# Patient Record
Sex: FEMALE | Race: OTHER | HISPANIC OR LATINO
[De-identification: names, ages, dates, MRNs, and addresses within clinical notes are randomized per-mention and may not be internally consistent; named-entity substitution may affect disease eponyms.]

---

## 2018-03-17 ENCOUNTER — APPOINTMENT (OUTPATIENT)
Dept: MAMMOGRAPHY | Facility: CLINIC | Age: 43
End: 2018-03-17
Payer: COMMERCIAL

## 2018-03-17 ENCOUNTER — OUTPATIENT (OUTPATIENT)
Dept: OUTPATIENT SERVICES | Facility: HOSPITAL | Age: 43
LOS: 1 days | End: 2018-03-17
Payer: COMMERCIAL

## 2018-03-17 DIAGNOSIS — Z00.8 ENCOUNTER FOR OTHER GENERAL EXAMINATION: ICD-10-CM

## 2018-03-17 PROCEDURE — 77067 SCR MAMMO BI INCL CAD: CPT

## 2018-03-17 PROCEDURE — 77063 BREAST TOMOSYNTHESIS BI: CPT

## 2018-03-17 PROCEDURE — 77067 SCR MAMMO BI INCL CAD: CPT | Mod: 26

## 2018-03-17 PROCEDURE — 77063 BREAST TOMOSYNTHESIS BI: CPT | Mod: 26

## 2019-08-20 PROBLEM — E78.5 HYPERLIPIDEMIA, UNSPECIFIED: Status: ACTIVE | Noted: 2019-08-20

## 2019-08-20 PROBLEM — Z87.39 HISTORY OF INFLAMMATION OF SACROILIAC JOINT: Status: RESOLVED | Noted: 2019-08-20 | Resolved: 2019-08-20

## 2019-08-20 PROBLEM — Z87.42 HISTORY OF AMENORRHEA: Status: RESOLVED | Noted: 2019-08-20 | Resolved: 2019-08-20

## 2019-08-20 PROBLEM — R94.31 ABNORMAL ECG: Status: ACTIVE | Noted: 2019-08-20

## 2019-08-21 ENCOUNTER — APPOINTMENT (OUTPATIENT)
Dept: CARDIOLOGY | Facility: CLINIC | Age: 44
End: 2019-08-21

## 2019-08-21 ENCOUNTER — OUTPATIENT (OUTPATIENT)
Dept: OUTPATIENT SERVICES | Facility: HOSPITAL | Age: 44
LOS: 1 days | End: 2019-08-21
Payer: SELF-PAY

## 2019-08-21 VITALS
BODY MASS INDEX: 30.44 KG/M2 | RESPIRATION RATE: 16 BRPM | DIASTOLIC BLOOD PRESSURE: 83 MMHG | WEIGHT: 145 LBS | SYSTOLIC BLOOD PRESSURE: 142 MMHG | HEART RATE: 53 BPM | HEIGHT: 58 IN

## 2019-08-21 DIAGNOSIS — Z87.39 PERSONAL HISTORY OF OTHER DISEASES OF THE MUSCULOSKELETAL SYSTEM AND CONNECTIVE TISSUE: ICD-10-CM

## 2019-08-21 DIAGNOSIS — Z87.42 PERSONAL HISTORY OF OTHER DISEASES OF THE FEMALE GENITAL TRACT: ICD-10-CM

## 2019-08-21 DIAGNOSIS — R94.31 ABNORMAL ELECTROCARDIOGRAM [ECG] [EKG]: ICD-10-CM

## 2019-08-21 DIAGNOSIS — R07.89 OTHER CHEST PAIN: ICD-10-CM

## 2019-08-21 DIAGNOSIS — I25.10 ATHEROSCLEROTIC HEART DISEASE OF NATIVE CORONARY ARTERY WITHOUT ANGINA PECTORIS: ICD-10-CM

## 2019-08-21 DIAGNOSIS — E78.5 HYPERLIPIDEMIA, UNSPECIFIED: ICD-10-CM

## 2019-08-29 ENCOUNTER — FORM ENCOUNTER (OUTPATIENT)
Age: 44
End: 2019-08-29

## 2019-08-30 ENCOUNTER — OUTPATIENT (OUTPATIENT)
Dept: OUTPATIENT SERVICES | Facility: HOSPITAL | Age: 44
LOS: 1 days | End: 2019-08-30
Payer: SELF-PAY

## 2019-08-30 DIAGNOSIS — I25.10 ATHEROSCLEROTIC HEART DISEASE OF NATIVE CORONARY ARTERY WITHOUT ANGINA PECTORIS: ICD-10-CM

## 2019-08-30 PROCEDURE — 93306 TTE W/DOPPLER COMPLETE: CPT | Mod: 26

## 2019-08-30 PROCEDURE — 93017 CV STRESS TEST TRACING ONLY: CPT

## 2019-08-30 PROCEDURE — 93306 TTE W/DOPPLER COMPLETE: CPT

## 2019-10-23 ENCOUNTER — APPOINTMENT (OUTPATIENT)
Dept: CARDIOLOGY | Facility: CLINIC | Age: 44
End: 2019-10-23

## 2020-02-27 ENCOUNTER — EMERGENCY (EMERGENCY)
Facility: HOSPITAL | Age: 45
LOS: 1 days | Discharge: HOME | End: 2020-02-27
Attending: EMERGENCY MEDICINE
Payer: MEDICAID

## 2020-02-27 VITALS
DIASTOLIC BLOOD PRESSURE: 66 MMHG | RESPIRATION RATE: 19 BRPM | OXYGEN SATURATION: 99 % | SYSTOLIC BLOOD PRESSURE: 133 MMHG | TEMPERATURE: 99 F | HEART RATE: 58 BPM

## 2020-02-27 VITALS
SYSTOLIC BLOOD PRESSURE: 160 MMHG | OXYGEN SATURATION: 99 % | TEMPERATURE: 98 F | HEIGHT: 60 IN | RESPIRATION RATE: 18 BRPM | HEART RATE: 68 BPM | DIASTOLIC BLOOD PRESSURE: 87 MMHG | WEIGHT: 147.93 LBS

## 2020-02-27 DIAGNOSIS — Z90.49 ACQUIRED ABSENCE OF OTHER SPECIFIED PARTS OF DIGESTIVE TRACT: Chronic | ICD-10-CM

## 2020-02-27 LAB
ALBUMIN SERPL ELPH-MCNC: 3.9 G/DL — SIGNIFICANT CHANGE UP (ref 3.3–5.2)
ALP SERPL-CCNC: 162 U/L — HIGH (ref 40–120)
ALT FLD-CCNC: 123 U/L — HIGH
ANION GAP SERPL CALC-SCNC: 14 MMOL/L — SIGNIFICANT CHANGE UP (ref 5–17)
AST SERPL-CCNC: 55 U/L — HIGH
BASOPHILS # BLD AUTO: 0.03 K/UL — SIGNIFICANT CHANGE UP (ref 0–0.2)
BASOPHILS NFR BLD AUTO: 0.5 % — SIGNIFICANT CHANGE UP (ref 0–2)
BILIRUB SERPL-MCNC: 0.2 MG/DL — LOW (ref 0.4–2)
BUN SERPL-MCNC: 12 MG/DL — SIGNIFICANT CHANGE UP (ref 8–20)
CALCIUM SERPL-MCNC: 8.9 MG/DL — SIGNIFICANT CHANGE UP (ref 8.6–10.2)
CHLORIDE SERPL-SCNC: 103 MMOL/L — SIGNIFICANT CHANGE UP (ref 98–107)
CO2 SERPL-SCNC: 21 MMOL/L — LOW (ref 22–29)
CREAT SERPL-MCNC: 0.62 MG/DL — SIGNIFICANT CHANGE UP (ref 0.5–1.3)
EOSINOPHIL # BLD AUTO: 0.1 K/UL — SIGNIFICANT CHANGE UP (ref 0–0.5)
EOSINOPHIL NFR BLD AUTO: 1.5 % — SIGNIFICANT CHANGE UP (ref 0–6)
GLUCOSE SERPL-MCNC: 143 MG/DL — HIGH (ref 70–99)
HCG SERPL-ACNC: <4 MIU/ML — SIGNIFICANT CHANGE UP
HCT VFR BLD CALC: 37.4 % — SIGNIFICANT CHANGE UP (ref 34.5–45)
HGB BLD-MCNC: 12.6 G/DL — SIGNIFICANT CHANGE UP (ref 11.5–15.5)
IMM GRANULOCYTES NFR BLD AUTO: 0.5 % — SIGNIFICANT CHANGE UP (ref 0–1.5)
LYMPHOCYTES # BLD AUTO: 2.25 K/UL — SIGNIFICANT CHANGE UP (ref 1–3.3)
LYMPHOCYTES # BLD AUTO: 33.8 % — SIGNIFICANT CHANGE UP (ref 13–44)
MCHC RBC-ENTMCNC: 29.1 PG — SIGNIFICANT CHANGE UP (ref 27–34)
MCHC RBC-ENTMCNC: 33.7 GM/DL — SIGNIFICANT CHANGE UP (ref 32–36)
MCV RBC AUTO: 86.4 FL — SIGNIFICANT CHANGE UP (ref 80–100)
MONOCYTES # BLD AUTO: 0.55 K/UL — SIGNIFICANT CHANGE UP (ref 0–0.9)
MONOCYTES NFR BLD AUTO: 8.3 % — SIGNIFICANT CHANGE UP (ref 2–14)
NEUTROPHILS # BLD AUTO: 3.69 K/UL — SIGNIFICANT CHANGE UP (ref 1.8–7.4)
NEUTROPHILS NFR BLD AUTO: 55.4 % — SIGNIFICANT CHANGE UP (ref 43–77)
PLATELET # BLD AUTO: 179 K/UL — SIGNIFICANT CHANGE UP (ref 150–400)
POTASSIUM SERPL-MCNC: 4 MMOL/L — SIGNIFICANT CHANGE UP (ref 3.5–5.3)
POTASSIUM SERPL-SCNC: 4 MMOL/L — SIGNIFICANT CHANGE UP (ref 3.5–5.3)
PROT SERPL-MCNC: 6.9 G/DL — SIGNIFICANT CHANGE UP (ref 6.6–8.7)
RBC # BLD: 4.33 M/UL — SIGNIFICANT CHANGE UP (ref 3.8–5.2)
RBC # FLD: 12.6 % — SIGNIFICANT CHANGE UP (ref 10.3–14.5)
SODIUM SERPL-SCNC: 138 MMOL/L — SIGNIFICANT CHANGE UP (ref 135–145)
TROPONIN T SERPL-MCNC: <0.01 NG/ML — SIGNIFICANT CHANGE UP (ref 0–0.06)
WBC # BLD: 6.65 K/UL — SIGNIFICANT CHANGE UP (ref 3.8–10.5)
WBC # FLD AUTO: 6.65 K/UL — SIGNIFICANT CHANGE UP (ref 3.8–10.5)

## 2020-02-27 PROCEDURE — 84702 CHORIONIC GONADOTROPIN TEST: CPT

## 2020-02-27 PROCEDURE — 71046 X-RAY EXAM CHEST 2 VIEWS: CPT | Mod: 26

## 2020-02-27 PROCEDURE — T1013: CPT

## 2020-02-27 PROCEDURE — 73030 X-RAY EXAM OF SHOULDER: CPT | Mod: 26,LT

## 2020-02-27 PROCEDURE — 96374 THER/PROPH/DIAG INJ IV PUSH: CPT

## 2020-02-27 PROCEDURE — 85027 COMPLETE CBC AUTOMATED: CPT

## 2020-02-27 PROCEDURE — 99284 EMERGENCY DEPT VISIT MOD MDM: CPT | Mod: 25

## 2020-02-27 PROCEDURE — 93005 ELECTROCARDIOGRAM TRACING: CPT

## 2020-02-27 PROCEDURE — 80053 COMPREHEN METABOLIC PANEL: CPT

## 2020-02-27 PROCEDURE — 99285 EMERGENCY DEPT VISIT HI MDM: CPT

## 2020-02-27 PROCEDURE — 93010 ELECTROCARDIOGRAM REPORT: CPT

## 2020-02-27 PROCEDURE — 73030 X-RAY EXAM OF SHOULDER: CPT

## 2020-02-27 PROCEDURE — 71046 X-RAY EXAM CHEST 2 VIEWS: CPT

## 2020-02-27 PROCEDURE — 84484 ASSAY OF TROPONIN QUANT: CPT

## 2020-02-27 PROCEDURE — 36415 COLL VENOUS BLD VENIPUNCTURE: CPT

## 2020-02-27 RX ORDER — LIDOCAINE 4 G/100G
1 CREAM TOPICAL
Qty: 4 | Refills: 0
Start: 2020-02-27 | End: 2020-03-01

## 2020-02-27 RX ORDER — LIDOCAINE 4 G/100G
1 CREAM TOPICAL ONCE
Refills: 0 | Status: COMPLETED | OUTPATIENT
Start: 2020-02-27 | End: 2020-02-27

## 2020-02-27 RX ORDER — ASPIRIN/CALCIUM CARB/MAGNESIUM 324 MG
162 TABLET ORAL ONCE
Refills: 0 | Status: COMPLETED | OUTPATIENT
Start: 2020-02-27 | End: 2020-02-27

## 2020-02-27 RX ORDER — KETOROLAC TROMETHAMINE 30 MG/ML
15 SYRINGE (ML) INJECTION ONCE
Refills: 0 | Status: DISCONTINUED | OUTPATIENT
Start: 2020-02-27 | End: 2020-02-27

## 2020-02-27 RX ORDER — METHOCARBAMOL 500 MG/1
1000 TABLET, FILM COATED ORAL ONCE
Refills: 0 | Status: COMPLETED | OUTPATIENT
Start: 2020-02-27 | End: 2020-02-27

## 2020-02-27 RX ORDER — METHOCARBAMOL 500 MG/1
3 TABLET, FILM COATED ORAL
Qty: 30 | Refills: 0
Start: 2020-02-27 | End: 2020-03-02

## 2020-02-27 RX ADMIN — Medication 15 MILLIGRAM(S): at 21:16

## 2020-02-27 RX ADMIN — Medication 162 MILLIGRAM(S): at 19:01

## 2020-02-27 RX ADMIN — METHOCARBAMOL 1000 MILLIGRAM(S): 500 TABLET, FILM COATED ORAL at 21:16

## 2020-02-27 RX ADMIN — LIDOCAINE 1 PATCH: 4 CREAM TOPICAL at 21:16

## 2020-02-27 NOTE — ED ADULT NURSE NOTE - OBJECTIVE STATEMENT
pt care assumed at 1850, no apparent distress noted at this time. pt received Alert and Oriented to person, place, situation and time resting in bed comfortably with spouse at bedside. pt c/o left arm with radiation to the shoulder and chest that started Monday night. pt denies chest pain at this time. pt states pain is most intense in her shoulder and with movement. 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 HR is NSR on cardiac monitor, lung sounds are clear b/l, abd is soft and nontender with positive bowel sounds in all four quadrants, skin is warm, dry and appropriate for age and race.

## 2020-02-27 NOTE — ED PROVIDER NOTE - PATIENT PORTAL LINK FT
You can access the FollowMyHealth Patient Portal offered by [facility] by registering at the following website: [email or web address]. By joining [employer]’s FollowMyHealth portal, you will also be able to view your health information using other applications (apps) compatible with our system.

## 2020-02-27 NOTE — ED PROVIDER NOTE - PHYSICAL EXAMINATION
General: well appearing, NAD  Head:  NC, AT  Eyes: EOMI, PERRLA, no scleral icterus  Ears: no erythema/drainage  Nose: midline, no bleeding/drainage  Throat: uvula midline, no lesions, MMM  Cardiac: RRR, no m/r/g, no lower extremity edema  Respiratory: CTABL, no wheezes/rales, equal chest wall expansions  Abdomen: soft, ND, NT, no rebound tenderness, no guarding, nonperitonitic  MSK/Vascular: full ROM, distal pulses intact, soft compartments, warm extremities, tenderness on palpation of the left shoulder  Neuro: AAOx3, motor/sensory intact, LUE range limited secondary to pain at the level of the shoulder  Psych: calm, cooperative, normal affect

## 2020-02-27 NOTE — ED PROVIDER NOTE - CARE PROVIDER_API CALL
Dayan Irwin)  Orthopedics  [address] [place] [address], [address]  [place] [address]  Phone: [phone]  Fax: [phone]  Follow Up Time:

## 2020-02-27 NOTE — ED ADULT NURSE NOTE - CHIEF COMPLAINT QUOTE
Patient arrived to ED today with c/o chest pain, left arm pain, left shoulder pain, left sided neck pain for the past 3 days.  Patient denies injury.  Patient has trouble moving her arm due to the pain.

## 2020-02-27 NOTE — ED PROVIDER NOTE - NSFOLLOWUPINSTRUCTIONS_ED_ALL_ED_FT
Continúe estirando y moviendo el brazo para evitar el hombro congelado, que puede ser raleigh enfermedad y complicación debilitante de por jeremy. Use ibuprofeno y tylenol. Hielo y compresión para mayor comodidad. Gisella un seguimiento con bishop proveedor de atención primaria y cirugía ortopédica dentro de raleigh semana.    Continue to stretch and move your arm to avoid frozen shoulder which can be a life long debilitating disease and complication. Use ibuprofen and tylenol. Ice and compression for comfort. Follow up with your primary care provider and orthopaedic surgery within one week.    -----------------------------------------------------------------------------------------------------------    Tendinitis  (Tendinitis)    La tendinitis es la hinchazón (inflamación) de los tendones. Un tendón es un tejido blando que conecta el músculo al hueso. La tendinitis puede causar dolor, hinchazón o sensibilidad con la palpación. Generalmente, se trata con tratamiento de RHCE. RHCE significa:    Reposo.  Hielo.  Compresión. [place]au significa aplicar presión en la pramod afectada.  Elevación. [place]au significa mantener la pramod afectada elevada por encima del nivel del corazón.     CUIDADOS EN EL HOGAR  Si tiene raleigh férula o un dispositivo ortopédico:    Use la férula o el dispositivo ortopédico según las indicaciones de bishop médico. Quíteselos solamente bob se lo haya indicado el médico.  Afloje la férula o el dispositivo ortopédico si los dedos de las maría o de los pies se le entumecen, siente hormigueos o se le enfrían y se tornan de color guy.  No se dé lincoln de inmersión, no practique natación ni use el jacuzzi hasta que el médico lo apruebe. Pregúntele al médico si puede ducharse. Parish vez solo le permitan justin lincoln de esponja.  No permita que bishop férula o dispositivo ortopédico se mojen si no son impermeables.  Si la férula o el dispositivo ortopédico no son impermeables, cúbralos con raleigh bolsa de plástico hermética cuando tome un baño de inmersión o raleigh ducha.  Mantenga la férula o el dispositivo ortopédico limpios y secos.    Control del dolor, de la rigidez y de la hinchazón    Si se lo indican, aplique hielo en la pramod afectada.  Ponga el hielo en raleigh bolsa plástica.  Coloque raleigh toalla entre la piel y la bolsa de hielo.  Coloque el hielo shar 20 minutos, 2 a 3 veces por día.  Si se lo indican, aplique calor en la pramod afectada con la frecuencia que le haya indicado el médico. Use la brittney del calor que le recomiende el médico.  Coloque raleigh toalla entre la piel y la brittney de calor.  Aplique el calor shar 20 a 30 minutos.  Retire el calor si la piel se le pone de color reynolds brillante. Choteau es muy importante si no puede sentir el dolor, el calor o el frío. Puede correr un riesgo mayor de sufrir quemaduras.  Mueva los dedos de las maría o de los pies del brazo o la pierna afectados con frecuencia, si esto corresponde. [place]au ayuda a evitar el entumecimiento y reducir la hinchazón.  Si se lo indican, eleve la pramod afectada por encima del nivel del corazón mientras está sentado o acostado.    Conducir    No conduzca ni use maquinaria pesada mientras leander analgésicos recetados.  Consulte al médico cuándo puede volver a conducir vehículos, si usted tiene raleigh férula o un dispositivo ortopédico en cualquier parte del brazo o de la pierna.    Actividad    Retome manuel actividades habituales bob se lo haya indicado el médico. Pregúntele al médico qué actividades son seguras para usted.  Gisella reposo a fin de descansar el área afectada, bob se lo haya indicado el médico.  Evite usar la pramod afectada mientras tenga tendinitis.  Gisella ejercicios (fisioterapia) bob se lo haya indicado el médico.    Instrucciones generales    Si tiene raleigh férula, no ejerza presión en ninguna parte de la misma hasta que se haya endurecido por completo. Ch[place]au puede tardar varias horas.  Use las vendas elásticas (compresión) o vendaje para aplicar presión solamente bob se lo haya indicado el médico.  Tusculum los medicamentos de venta nivia y los recetados solamente bob se lo haya indicado el médico.  Concurra a todas las visitas de control bob se lo haya indicado el médico. Choteau es importante.    SOLICITE AYUDA SI:  No se siente mejor.  Experimenta problemas nuevos, bob entumecimiento de las maría y no sabe el motivo.      ----------------------------------------------------------------------------------------------------------------------------    Tendinitis    Tendinitis is swelling (inflammation) of a tendon. A tendon is cord of tissue that connects muscle to bone. Tendinitis can cause pain, tenderness, and swelling. It is usually treated with RICE therapy. RICE stands for:     Rest.  Ice.  Compression. This means putting pressure on the affected area.  Elevation. This means raising the affected area above the level of your heart.    Follow these instructions at home:  If you have a splint or brace:    Wear the splint or brace as told by your doctor. Remove it only as told by your doctor.  Loosen the splint or brace if your fingers or toes tingle, become numb, or turn cold and blue.  Do not take baths, swim, or use a hot tub until your doctor approves. Ask your doctor if you can take showers. You may only be able to take sponge baths for bathing.  Do not let your splint or brace get wet if it is not waterproof.  If your splint or brace is not waterproof, cover it with a watertight plastic bag when you take a bath or a shower.  Keep the splint or brace clean.    Managing pain, stiffness, and swelling    If directed, apply ice to the affected area.  Put ice in a plastic bag.  Place a towel between your skin and the bag.  Leave the ice on for 20 minutes, 2–3 times a day.  If directed, apply heat to the affected area as often as told by your doctor. Use the heat source that your doctor recommends.  Place a towel between your skin and the heat source.  Leave the heat on for 20–30 minutes.  Take off the heat if your skin turns bright red. This is especially important if you are unable to feel pain, heat, or cold. You may have a greater risk of getting burned.  Move the fingers or toes of the affected arm or leg often, if this applies. This helps to prevent stiffness and to lessen swelling.  If directed, raise the affected area above the level of your heart while you are sitting or lying down.    Driving    Do not drive or use heavy machinery while taking prescription pain medicine.  Ask your doctor when it is safe to drive if you have a splint or brace on any part of your arm or leg.    Activity    Return to your normal activities as told by your doctor. Ask your doctor what activities are safe for you.  Rest the affected area as told by your doctor.  Avoid using the affected area while you have tendinitis.  Do exercises (physical therapy) as told by your doctor.    General instructions    If you have a splint, do not put pressure on any part of the splint until it is fully hardened. This may take several hours.  Wear an elastic bandage or pressure (compression) wrap only as told by your doctor.  Take over-the-counter and prescription medicines only as told by your doctor.  Keep all follow-up visits as told by your doctor. This is important.    Contact a doctor if:  You do not get better.  You get new problems, such as numbness in your hands, and you do not know why.

## 2020-02-27 NOTE — ED ADULT TRIAGE NOTE - CHIEF COMPLAINT QUOTE
Patient arrived to ED today with c/o left arm pain, left shoulder pain, left sided neck pain for the past 3 days.  Patient denies injury.  Patient has trouble moving her arm due to the pain. Patient arrived to ED today with c/o chest pain, left arm pain, left shoulder pain, left sided neck pain for the past 3 days.  Patient denies injury.  Patient has trouble moving her arm due to the pain.

## 2020-02-27 NOTE — ED PROVIDER NOTE - CLINICAL SUMMARY MEDICAL DECISION MAKING FREE TEXT BOX
Pt is a 44 y.o. F presenting with left shoulder pain with radiation. Labs, imaging, ekg, meds. Will follow up.

## 2020-02-27 NOTE — ED PROVIDER NOTE - OBJECTIVE STATEMENT
Pt is a 44 y.o. F presenting with left shoulder pain that radiates to the chest and down the arm for three days. Pain is constant, the pt complains she cannot move her arm because of the shoulder pain, and cannot put on her shirt by herself. No past medical history, had a cholecystectomy. Denies shortness of breath, denies pain in her hand.

## 2020-02-27 NOTE — ED PROVIDER NOTE - ATTENDING CONTRIBUTION TO CARE
I, Noel Abbott, personally saw the patient with the resident, and completed the key components of the history and physical exam. I then discussed the management plan with the resident.    45 yo F p/w left shoulder pain and chest pain x 3 days. She report unable to move her left shoulder secondary to pain. Pain over the left anterior shoulder, good  strength. xray showed calcified tendon. EKG without ischemic changes. trop negative. patient given asa, lidocain, robaxin, and lidocaine patch. Home on those meds.

## 2020-02-27 NOTE — ED PROVIDER NOTE - NS ED ROS FT
Constitutional: no fever, sweats, and no chills.  Eyes: no pain, no redness, and no visual changes.  ENMT: no ear pain and no hearing problems, no nasal congestion/drainage, no dysphagia, and no throat pain, no neck pain, no stiffness  CV: no edema.  Resp: no cough, no dyspnea  GI: no abdominal pain, no bloating, no constipation, no diarrhea, no nausea and no vomiting.  : no dysuria, no hematuria  MSK: no weakness  Skin: no jaundice, no lesions, and no rashes.  Neuro: no LOC, no headache, no sensory deficits, and no weakness

## 2021-02-17 ENCOUNTER — EMERGENCY (EMERGENCY)
Facility: HOSPITAL | Age: 46
LOS: 1 days | Discharge: HOME | End: 2021-02-17
Payer: MEDICAID

## 2021-02-17 VITALS
SYSTOLIC BLOOD PRESSURE: 148 MMHG | HEART RATE: 70 BPM | WEIGHT: 149.91 LBS | HEIGHT: 60 IN | RESPIRATION RATE: 18 BRPM | OXYGEN SATURATION: 98 % | TEMPERATURE: 99 F | DIASTOLIC BLOOD PRESSURE: 83 MMHG

## 2021-02-17 DIAGNOSIS — Z90.49 ACQUIRED ABSENCE OF OTHER SPECIFIED PARTS OF DIGESTIVE TRACT: Chronic | ICD-10-CM

## 2021-02-17 LAB — SARS-COV-2 RNA SPEC QL NAA+PROBE: SIGNIFICANT CHANGE UP

## 2021-02-17 PROCEDURE — 99282 EMERGENCY DEPT VISIT SF MDM: CPT

## 2021-02-17 PROCEDURE — 99283 EMERGENCY DEPT VISIT LOW MDM: CPT

## 2021-02-17 PROCEDURE — U0005: CPT

## 2021-02-17 PROCEDURE — U0003: CPT

## 2021-02-17 NOTE — ED PROVIDER NOTE - NS ED ROS FT
Denies fever, chills, fatigue, and weight loss. Denies HA, Dizziness.   ENMT: Denies URI symptoms, difficulty swallowing, sore throat, loss of taste or smell.   CARDIO: Denies CP, palpitations, edema.   RESP: Denies Cough, SOB, Diff breathing,  GI: Denies N/V, ABD pain,

## 2021-02-17 NOTE — ED PROVIDER NOTE - PATIENT PORTAL LINK FT
You can access the FollowMyHealth Patient Portal offered by [facility] by registering at the following website: [email or web address]. By joining [employer]’s FollowMyHealth portal, you will also be able to view your health information using other applications (apps) compatible with our system.

## 2021-02-17 NOTE — ED PROVIDER NOTE - OBJECTIVE STATEMENT
Pt presenting to the ER for COVID-19 testing. Denies fevers chills, loss of taste or smell, URI symptoms, chest pain or shortness of breath, nausea vomiting diarrhea abdominal pain, weakness or fatigue. Eating and drinking normal diet. Normal output. Pt requesting testing at this time. [x] known exposure [] no-known exposure [] travel [x] no travel [ ] smoker [x ] non-smoker

## 2021-02-17 NOTE — ED PROVIDER NOTE - CLINICAL SUMMARY MEDICAL DECISION MAKING FREE TEXT BOX
Pt nontoxic appearing, stable vitals, ambulatory with stable saturation without supplemental oxygen. PT does not meet criteria listed in most updated guidelines as per [facility] protocol/algorithm for admission at this time. pt advised about self-quarantine instructions until negative test results and/or symptom resolution. pt advised on hand hygiene, monitoring of symptoms, antipyretic use as well as and fu with primary care provider. Instructions given in pre-printed copy.

## 2024-01-17 ENCOUNTER — APPOINTMENT (OUTPATIENT)
Dept: GASTROENTEROLOGY | Facility: CLINIC | Age: 49
End: 2024-01-17
Payer: MEDICAID

## 2024-01-17 ENCOUNTER — NON-APPOINTMENT (OUTPATIENT)
Age: 49
End: 2024-01-17

## 2024-01-17 VITALS
WEIGHT: 150 LBS | DIASTOLIC BLOOD PRESSURE: 80 MMHG | OXYGEN SATURATION: 98 % | HEART RATE: 81 BPM | BODY MASS INDEX: 31.49 KG/M2 | RESPIRATION RATE: 16 BRPM | HEIGHT: 58 IN | SYSTOLIC BLOOD PRESSURE: 140 MMHG

## 2024-01-17 DIAGNOSIS — K62.5 HEMORRHAGE OF ANUS AND RECTUM: ICD-10-CM

## 2024-01-17 DIAGNOSIS — Z12.11 ENCOUNTER FOR SCREENING FOR MALIGNANT NEOPLASM OF COLON: ICD-10-CM

## 2024-01-17 PROCEDURE — 99203 OFFICE O/P NEW LOW 30 MIN: CPT

## 2024-01-17 RX ORDER — METHYLPREDNISOLONE 4 MG/1
4 TABLET ORAL
Refills: 0 | Status: ACTIVE | COMMUNITY

## 2024-01-17 RX ORDER — POLYETHYLENE GLYCOL 3350 17 G/17G
17 POWDER, FOR SOLUTION ORAL
Qty: 238 | Refills: 0 | Status: ACTIVE | COMMUNITY
Start: 2024-01-17 | End: 1900-01-01

## 2024-01-17 RX ORDER — NAPROXEN 500 MG/1
500 TABLET ORAL
Refills: 0 | Status: DISCONTINUED | COMMUNITY
End: 2024-01-17

## 2024-01-17 RX ORDER — MULTIVITAMIN
TABLET ORAL DAILY
Refills: 0 | Status: DISCONTINUED | COMMUNITY
End: 2024-01-17

## 2024-01-17 NOTE — PHYSICAL EXAM
[Alert] : alert [Normal Voice/Communication] : normal voice/communication [Healthy Appearing] : healthy appearing [No Acute Distress] : no acute distress [Sclera] : the sclera and conjunctiva were normal [Hearing Threshold Finger Rub Not [place]] : hearing was normal [Normal Lips/Gums] : the lips and gums were normal [Oropharynx] : the oropharynx was normal [Normal Appearance] : the appearance of the neck was normal [No Neck Mass] : no neck mass was observed [No Respiratory Distress] : no respiratory distress [No Acc Muscle Use] : no accessory muscle use [Respiration, Rhythm And Depth] : normal respiratory rhythm and effort [Auscultation Breath Sounds / Voice Sounds] : lungs were clear to auscultation bilaterally [Heart Rate And Rhythm] : heart rate was normal and rhythm regular [Normal S1, S2] : normal S1 and S2 [Murmurs] : no murmurs [None] : no edema [Abdomen Tenderness] : non-tender [No Masses] : no abdominal mass palpated [Abdomen Soft] : soft [Abnormal Walk] : normal gait [Normal Color / Pigmentation] : normal skin color and pigmentation [No Focal Deficits] : no focal deficits [Oriented To Time, Place, And Person] : oriented to person, place, and time

## 2024-03-05 ENCOUNTER — APPOINTMENT (OUTPATIENT)
Dept: GASTROENTEROLOGY | Facility: GI CENTER | Age: 49
End: 2024-03-05